# Patient Record
Sex: MALE | Race: BLACK OR AFRICAN AMERICAN | Employment: UNEMPLOYED | URBAN - METROPOLITAN AREA
[De-identification: names, ages, dates, MRNs, and addresses within clinical notes are randomized per-mention and may not be internally consistent; named-entity substitution may affect disease eponyms.]

---
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
## 2022-09-04 ENCOUNTER — OFFICE VISIT (OUTPATIENT)
Dept: URGENT CARE | Facility: URGENT CARE | Age: 22
End: 2022-09-04
Payer: COMMERCIAL

## 2022-09-04 VITALS
TEMPERATURE: 97.6 F | DIASTOLIC BLOOD PRESSURE: 62 MMHG | OXYGEN SATURATION: 100 % | BODY MASS INDEX: 22.69 KG/M2 | WEIGHT: 172 LBS | SYSTOLIC BLOOD PRESSURE: 100 MMHG | HEART RATE: 76 BPM

## 2022-09-04 DIAGNOSIS — H61.23 BILATERAL IMPACTED CERUMEN: ICD-10-CM

## 2022-09-04 DIAGNOSIS — H61.20 IMPACTED CERUMEN, UNSPECIFIED LATERALITY: Primary | ICD-10-CM

## 2022-09-04 PROCEDURE — 99213 OFFICE O/P EST LOW 20 MIN: CPT | Performed by: NURSE PRACTITIONER

## 2022-09-04 RX ORDER — NEOMYCIN SULFATE, POLYMYXIN B SULFATE AND HYDROCORTISONE 10; 3.5; 1 MG/ML; MG/ML; [USP'U]/ML
3 SUSPENSION/ DROPS AURICULAR (OTIC) 3 TIMES DAILY
Qty: 3 ML | Refills: 0 | Status: SHIPPED | OUTPATIENT
Start: 2022-09-04 | End: 2022-09-09

## 2022-09-04 NOTE — PROGRESS NOTES
HPI  Dioni Dawson 21 year old presents to the urgent care with bilateral cerumen impactions.  He states that this happens fairly often and that he has had to have them flushed several times.  He states that he does not have any pain.  He does state that he wears ear buds often and he also plays music and so uses headphones as well as earbuds.    Review of Systems   HENT: Positive for hearing loss. Negative for ear pain.       ROS: 10 point ROS neg other than the symptoms noted above in the HPI.      Physical Exam  Vitals and nursing note reviewed.   Constitutional:       General: He is not in acute distress.     Comments: /62   Pulse 76   Temp 97.6  F (36.4  C) (Tympanic)   Wt 78 kg (172 lb)   SpO2 100%   BMI 22.69 kg/m       HENT:      Right Ear: There is impacted cerumen.      Left Ear: There is impacted cerumen.      Ears:      Comments: After copious flushing with warm water a large amount of cerumen is extracted from the left ear and a smaller amount from the right.  The tympanic membrane on the left is slightly erythematous but intact.  Both canals are somewhat excoriated and will be treated with an antibiotic.  Cardiovascular:      Rate and Rhythm: Normal rate.   Pulmonary:      Effort: Pulmonary effort is normal.   Skin:     General: Skin is warm and dry.      Capillary Refill: Capillary refill takes less than 2 seconds.   Neurological:      Mental Status: He is alert and oriented to person, place, and time.       PROCEDURE NOTE: bilateral external auditory canal flushes with warm water.  Large amount of cerumen extracted in the water without instrumentation.  No discomfort, nausea, or dizziness and the patient tolerated this procedure very well.    Assessment:  1. Impacted cerumen, unspecified laterality    2. Bilateral impacted cerumen        Plan:  Orders Placed This Encounter     neomycin-polymyxin-hydrocortisone (CORTISPORIN) [id number] otic suspension       Instructions regarding  self-care of patient/child reviewed.   Written instructions provided in after visit summary and reviewed.  Patient instructed to see primary care provider for new or persistent symptoms.   Red flag symptoms reviewed and patient has been instructed to seek emergent care  Please contact pharmacy for medication questions.      The use of Dragon/Cree dictation services may have been used to construct the content in this note;   any grammatical or spelling errors are non-intentional. Please contact the author of this note directly if you   are in need of any clarification.     Anna Gold, DNP, APRN, CNP

## 2023-11-29 NOTE — ADDENDUM NOTE
Addended by: STACEY CRAMER on: 2/6/2020 03:06 PM     Modules accepted: Orders     Attending with Resident/Fellow/Student

## 2024-05-19 ENCOUNTER — HOSPITAL ENCOUNTER (EMERGENCY)
Facility: CLINIC | Age: 24
Discharge: HOME | End: 2024-05-19
Attending: EMERGENCY MEDICINE | Admitting: EMERGENCY MEDICINE
Payer: COMMERCIAL

## 2024-05-19 VITALS
BODY MASS INDEX: 20.97 KG/M2 | HEIGHT: 75 IN | RESPIRATION RATE: 18 BRPM | OXYGEN SATURATION: 98 % | HEART RATE: 63 BPM | SYSTOLIC BLOOD PRESSURE: 124 MMHG | TEMPERATURE: 97.5 F | WEIGHT: 168.65 LBS | DIASTOLIC BLOOD PRESSURE: 78 MMHG

## 2024-05-19 DIAGNOSIS — H61.23 BILATERAL IMPACTED CERUMEN: ICD-10-CM

## 2024-05-19 PROCEDURE — 99283 EMERGENCY DEPT VISIT LOW MDM: CPT

## 2024-05-19 PROCEDURE — 69209 REMOVE IMPACTED EAR WAX UNI: CPT | Mod: 50

## 2024-05-19 ASSESSMENT — COLUMBIA-SUICIDE SEVERITY RATING SCALE - C-SSRS
1. IN THE PAST MONTH, HAVE YOU WISHED YOU WERE DEAD OR WISHED YOU COULD GO TO SLEEP AND NOT WAKE UP?: NO
2. HAVE YOU ACTUALLY HAD ANY THOUGHTS OF KILLING YOURSELF IN THE PAST MONTH?: NO
6. HAVE YOU EVER DONE ANYTHING, STARTED TO DO ANYTHING, OR PREPARED TO DO ANYTHING TO END YOUR LIFE?: NO

## 2024-05-19 ASSESSMENT — ACTIVITIES OF DAILY LIVING (ADL): ADLS_ACUITY_SCORE: 33

## 2024-05-19 NOTE — ED PROVIDER NOTES
"  Emergency Department Note      History of Present Illness     Chief Complaint  Otalgia    HPI  Dioni Dawson is a 23 year old male who presents with concern for cerumen impaction of both ears. Pt having decreased hearing out of the ears. Pt cleans the ears ears with a frances pin. Mild discomfort in the ears. Pt also reports hx of chain smoking. Pt has intermittent chest pains. None currently. Pains sometimes hurt with breathing. Pt did just have a long car trip but sx preceded this trip. NO h/o DVT or PE. NO leg swelling.       Past Medical History   Medical History and Problem List  Past Medical History:   Diagnosis Date    ADHD (attention deficit hyperactivity disorder)     NO ACTIVE PROBLEMS        Medications  carbamide peroxide (DEBROX) 6.5 % otic solution  amphetamine-dextroamphetamine (ADDERALL XR) 20 MG 24 hr capsule  amphetamine-dextroamphetamine (ADDERALL) 10 MG tablet        Surgical History   Past Surgical History:   Procedure Laterality Date    C NO CHARGE LOS      IRRIGATION AND DEBRIDEMENT LOWER EXTREMITY, COMBINED Right 6/19/2015    Procedure: COMBINED IRRIGATION AND DEBRIDEMENT LOWER EXTREMITY;  Surgeon: Dilshad Muniz MD;  Location:  OR    NO HISTORY OF SURGERY       Physical Exam   Patient Vitals for the past 24 hrs:   BP Temp Temp src Pulse Resp SpO2 Height Weight   05/19/24 1408 124/78 -- -- 63 18 98 % -- --   05/19/24 1226 135/80 97.5  F (36.4  C) Temporal 69 20 97 % 1.905 m (6' 3\") 76.5 kg (168 lb 10.4 oz)     Physical Exam  VS: Reviewed per above  HENT: Mucous membranes moist.  Bilateral cerumen impaction.  After removal of cerumen, bilateral TMs are without bulging or significant injection.  There is some residual impaction of the left external otic canal.  EYES: sclera anicteric  CV: Rate as noted,  regular rhythm.   RESP: Effort normal. Breath sounds are normal bilaterally.  NEURO: Alert, moving all extremities  MSK: No deformity of the extremities  SKIN: Warm and " dry         Medical Decision Making / Diagnosis       MDM  Dioni Dawson is a 23 year old male who presents to the ER for evaluation of bilateral ear discomfort and concern for recurrent cerumen impaction.  He also mentions some sporadic vague chest discomfort but declines further evaluation here in the ER for this today.  With respect to the cerumen impaction, this was confirmed on exam.  ED technician performed copious irrigation with good effect.  There was some lingering cerumen in the left external otic canal and thus he will be prescribed Debrox drops.  Primary care follow-up recommended.  Return precaution discussed.    Disposition  The patient was discharged.     ICD-10 Codes:    ICD-10-CM    1. Bilateral impacted cerumen  H61.23            Discharge Medications  Discharge Medication List as of 5/19/2024  2:07 PM        START taking these medications    Details   carbamide peroxide (DEBROX) 6.5 % otic solution Place 5 drops into the right ear 2 times daily for 3 days, Disp-15 mL, R-0, Local Print              Massimo Burnette MD  05/19/24 4159

## 2024-05-19 NOTE — ED TRIAGE NOTES
"Pt arrives to the ED due to having clogged ears. States \"I need them cleaned out\". \"They said I may have a genetic thing where I have more ear wax\". Pt also states sore throat for past week. States \"I am a chain smoker and I also get some chest tightness, and phantom pain, so thought I should mention that while I am here\".         "

## 2024-05-20 ENCOUNTER — TELEPHONE (OUTPATIENT)
Dept: PEDIATRICS | Facility: CLINIC | Age: 24
End: 2024-05-20
Payer: COMMERCIAL

## 2024-05-20 NOTE — TELEPHONE ENCOUNTER
Hospital follow up review. Patient hasn't been seen at clinic since 2080 with internal medicine/peds. Patient's primary care provider is pediatric provider and patient wouldn't be able to seen provider due to age. Due to more than 2 year since last IM/peds appointment, no outreach will be made.    Thank you,  Bryant Benítez, Triage RN [facility] [place]  10:30 AM 5/20/2024

## 2025-03-01 ENCOUNTER — APPOINTMENT (OUTPATIENT)
Dept: GENERAL RADIOLOGY | Facility: CLINIC | Age: 25
End: 2025-03-01
Attending: EMERGENCY MEDICINE
Payer: COMMERCIAL

## 2025-03-01 ENCOUNTER — HOSPITAL ENCOUNTER (EMERGENCY)
Facility: CLINIC | Age: 25
Discharge: HOME | End: 2025-03-01
Attending: EMERGENCY MEDICINE | Admitting: EMERGENCY MEDICINE
Payer: COMMERCIAL

## 2025-03-01 VITALS
RESPIRATION RATE: 18 BRPM | SYSTOLIC BLOOD PRESSURE: 121 MMHG | BODY MASS INDEX: 21.76 KG/M2 | OXYGEN SATURATION: 97 % | WEIGHT: 175 LBS | HEART RATE: 84 BPM | HEIGHT: 75 IN | DIASTOLIC BLOOD PRESSURE: 64 MMHG | TEMPERATURE: 98.8 F
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
## 2025-03-01 DIAGNOSIS — S70.02XA CONTUSION OF LEFT HIP, INITIAL ENCOUNTER: ICD-10-CM

## 2025-03-01 DIAGNOSIS — S06.0X0A CONCUSSION WITHOUT LOSS OF CONSCIOUSNESS, INITIAL ENCOUNTER: ICD-10-CM

## 2025-03-01 DIAGNOSIS — S60.221A CONTUSION OF RIGHT HAND, INITIAL ENCOUNTER: ICD-10-CM
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
## 2025-03-01 PROCEDURE — 73130 X-RAY EXAM OF HAND: CPT | Mod: RT

## 2025-03-01 PROCEDURE — 99284 EMERGENCY DEPT VISIT MOD MDM: CPT

## 2025-03-01 PROCEDURE — 250N000013 HC RX MED GY IP 250 OP 250 PS 637: Performed by: EMERGENCY MEDICINE

## 2025-03-01 PROCEDURE — 73502 X-RAY EXAM HIP UNI 2-3 VIEWS: CPT

## 2025-03-01 RX ORDER — IBUPROFEN 200 MG
400 TABLET ORAL ONCE
Status: COMPLETED | OUTPATIENT
Start: 2025-03-01 | End: 2025-03-01

## 2025-03-01 RX ADMIN — IBUPROFEN 400 MG: 200 TABLET, FILM COATED ORAL at 11:17

## 2025-03-01 ASSESSMENT — ACTIVITIES OF DAILY LIVING (ADL)
ADLS_ACUITY_SCORE: 41
ADLS_ACUITY_SCORE: 41
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
## 2025-07-06 ENCOUNTER — OFFICE VISIT (OUTPATIENT)
Dept: URGENT CARE | Facility: URGENT CARE | Age: 25
End: 2025-07-06
Payer: COMMERCIAL

## 2025-07-06 VITALS
SYSTOLIC BLOOD PRESSURE: 107 MMHG | HEART RATE: 63 BPM | WEIGHT: 182.2 LBS | TEMPERATURE: 97.8 F | HEIGHT: 74 IN | DIASTOLIC BLOOD PRESSURE: 65 MMHG | RESPIRATION RATE: 21 BRPM | BODY MASS INDEX: 23.38 KG/M2 | OXYGEN SATURATION: 95 %

## 2025-07-06 DIAGNOSIS — H61.23 BILATERAL IMPACTED CERUMEN: Primary | ICD-10-CM

## 2025-07-06 PROCEDURE — 3078F DIAST BP <80 MM HG: CPT | Performed by: NURSE PRACTITIONER

## 2025-07-06 PROCEDURE — 3074F SYST BP LT 130 MM HG: CPT | Performed by: NURSE PRACTITIONER

## 2025-07-06 PROCEDURE — 69210 REMOVE IMPACTED EAR WAX UNI: CPT | Mod: 50 | Performed by: NURSE PRACTITIONER

## 2025-07-06 NOTE — PATIENT INSTRUCTIONS
Cerumenosis is noted.    Wax is removed by irrigation with syringing and manual debridement by nursing staff.    Debrox as needed.     Instructions for home care to prevent wax buildup are given.

## 2025-07-06 NOTE — PROGRESS NOTES
"Urgent Care Clinic Visit    Chief Complaint   Patient presents with    Cerumen (impacted)     Bilateral cerumen impacted.                7/6/2025     9:47 AM   Additional Questions   Roomed by Jl Urbano MA   Accompanied by Aldo Schmid     Assessment & Plan     Bilateral impacted cerumen  - REMOVE IMPACTED CERUMEN     Patient Instructions   Cerumenosis is noted.    Wax is removed by irrigation with syringing and manual debridement by nursing staff.    Debrox as needed.     Instructions for home care to prevent wax buildup are given.        Return in about 1 week (around 7/13/2025) for with regular provider if symptoms persist.    Stacie Murillo, DANNIE CNP  [facility] SAMANTA Wheatley is a 24 year old male who presents to clinic today for the following health issues:  Chief Complaint   Patient presents with    Cerumen (impacted)     Bilateral cerumen impacted.          7/6/2025     9:47 AM   Additional Questions   Roomed by Jl Urbano MA   Accompanied by Aldo Schmid     HPI    URI Adult    Onset of symptoms was 1 week(s) ago.  Course of illness is same.    Severity moderately severe  Current and Associated symptoms: ear pressure both with muffled hearing  Treatment measures tried include debrox.  Predisposing factors include HX of recurrent cerumen      Review of Systems  Constitutional, HEENT, cardiovascular, pulmonary, GI, , musculoskeletal, neuro, skin, endocrine and psych systems are negative, except as otherwise noted.      Objective    /65 (BP Location: Right arm, Patient Position: Sitting, Cuff Size: Adult Regular)   Pulse 63   Temp 97.8  F (36.6  C) (Oral)   Resp 21   Ht 1.882 m (6' 2.1\")   Wt 82.6 kg (182 lb 3.2 oz)   SpO2 95%   BMI 23.33 kg/m    Physical Exam   GENERAL: alert and no distress  EYES: Eyes grossly normal to inspection, PERRL and conjunctivae and sclerae normal  HENT: normal cephalic/atraumatic, both ears: occluded with wax,  " AFTER IRRIGATION BOTH EARS normal: no effusions, no erythema, normal landmarks and  nose and mouth without ulcers or lesions, oropharynx clear, and oral mucous membranes moist  NECK: no adenopathy, no asymmetry, masses, or scars  RESP: lungs clear to auscultation - no rales, rhonchi or wheezes  CV: regular rate and rhythm, normal S1 S2, no S3 or S4, no murmur, click or rub, no peripheral edema